# Patient Record
Sex: FEMALE | Race: WHITE | Employment: FULL TIME | URBAN - METROPOLITAN AREA
[De-identification: names, ages, dates, MRNs, and addresses within clinical notes are randomized per-mention and may not be internally consistent; named-entity substitution may affect disease eponyms.]

---

## 2018-04-23 ENCOUNTER — TRANSFERRED RECORDS (OUTPATIENT)
Dept: HEALTH INFORMATION MANAGEMENT | Facility: CLINIC | Age: 33
End: 2018-04-23

## 2018-04-23 ENCOUNTER — TRANSFERRED RECORDS (OUTPATIENT)
Dept: MULTI SPECIALTY CLINIC | Facility: CLINIC | Age: 33
End: 2018-04-23

## 2018-04-23 LAB
HPV ABSTRACT: NORMAL
HPV ABSTRACT: NORMAL
PAP-ABSTRACT: NORMAL

## 2020-01-21 ENCOUNTER — OFFICE VISIT (OUTPATIENT)
Dept: ORTHOPEDICS | Facility: CLINIC | Age: 35
End: 2020-01-21
Payer: COMMERCIAL

## 2020-01-21 VITALS
RESPIRATION RATE: 16 BRPM | WEIGHT: 153 LBS | HEIGHT: 66 IN | SYSTOLIC BLOOD PRESSURE: 146 MMHG | DIASTOLIC BLOOD PRESSURE: 89 MMHG | BODY MASS INDEX: 24.59 KG/M2

## 2020-01-21 DIAGNOSIS — M23.91 INTERNAL DERANGEMENT OF RIGHT KNEE: Primary | ICD-10-CM

## 2020-01-21 PROCEDURE — 99204 OFFICE O/P NEW MOD 45 MIN: CPT | Performed by: ORTHOPAEDIC SURGERY

## 2020-01-21 RX ORDER — IBUPROFEN 200 MG
200 TABLET ORAL EVERY 4 HOURS PRN
COMMUNITY
End: 2020-03-06

## 2020-01-21 RX ORDER — ETONOGESTREL AND ETHINYL ESTRADIOL VAGINAL RING .015; .12 MG/D; MG/D
1 RING VAGINAL
COMMUNITY

## 2020-01-21 ASSESSMENT — MIFFLIN-ST. JEOR: SCORE: 1410.75

## 2020-01-21 NOTE — PATIENT INSTRUCTIONS
- Please call [facility] at [phone] to schedule your MRI.     [facility] is located at:   [address] [place] [address]    -  Once your MRI is scheduled, make an appointment to discuss the results with Dr. Sandro Phipps.  You can call [phone] to make this appointment, anytime 2-3 days after your MRI scan is performed. Dr. Phipps prefers patients to come in for a follow-up appointment to discuss next steps after the MRI.

## 2020-01-21 NOTE — PROGRESS NOTES
Nathaniel Chowdhury is a 34 year old female who is seen as self referral for right knee pain.  She states that her pain began approximately 2018 with no specific mechanism of injury.  She states that her pain is episodic and described as shooting, sharp, aching, and can be rated anywhere from a 3 out of 10 to an 8 out of 10.  She works as a .  Her pain is worse with kneeling and bending; better with rest, not bending, not kneeling, and wearing a compression sleeve.  She has not done anything specific for the knee so far.  She does take Advil 1-2 times a day for various aches and pains and other things throughout the body not specifically for the knee. Here today for further evaluation.     History reviewed. No pertinent past medical history.    History reviewed. No pertinent surgical history.    Family History   Problem Relation Age of Onset     Emphysema Maternal Grandmother      Pneumonia Maternal Grandfather        Social History     Socioeconomic History     Marital status:      Spouse name: Not on file     Number of children: Not on file     Years of education: Not on file     Highest education level: Not on file   Occupational History     Not on file   Social Needs     Financial resource strain: Not on file     Food insecurity:     Worry: Not on file     Inability: Not on file     Transportation needs:     Medical: Not on file     Non-medical: Not on file   Tobacco Use     Smoking status: Former Smoker     Start date: 2003     Last attempt to quit: 2008     Years since quittin.0     Smokeless tobacco: Never Used   Substance and Sexual Activity     Alcohol use: Not on file     Drug use: Not on file     Sexual activity: Not on file   Lifestyle     Physical activity:     Days per week: Not on file     Minutes per session: Not on file     Stress: Not on file   Relationships     Social connections:     Talks on phone: Not on file     Gets together: Not on file     Attends  "Jewish service: Not on file     Active member of club or organization: Not on file     Attends meetings of clubs or organizations: Not on file     Relationship status: Not on file     Intimate partner violence:     Fear of current or ex partner: Not on file     Emotionally abused: Not on file     Physically abused: Not on file     Forced sexual activity: Not on file   Other Topics Concern     Not on file   Social History Narrative     Not on file       Current Outpatient Medications   Medication Sig Dispense Refill     etonogestrel-ethinyl estradiol (NUVARING) 0.12-0.015 MG/24HR vaginal ring Place 1 each vaginally every 28 days       ibuprofen (ADVIL/MOTRIN) 200 MG tablet Take 200 mg by mouth every 4 hours as needed for mild pain         No Known Allergies    REVIEW OF SYSTEMS:  CONSTITUTIONAL:  NEGATIVE for fever, chills, change in weight, not feeling tired  SKIN:  NEGATIVE for worrisome rashes, no skin lumps, no skin ulcers and no non-healing wounds  EYES:  NEGATIVE for vision changes or irritation.  ENT/MOUTH:  NEGATIVE.  No hearing loss, no hoarseness, no difficulty swallowing.  RESP:  NEGATIVE. No cough or shortness of breath.  CV:  NEGATIVE for chest pain, palpitations or peripheral edema  GI:  NEGATIVE for nausea, abdominal pain, heartburn, or change in bowel habits  :  Negative. No dysuria, no hematuria  MUSCULOSKELETAL:  See HPI above  NEURO:  NEGATIVE . No headaches, no dizziness,  no numbness  ENDOCRINE:  NEGATIVE for temperature intolerance, skin/hair changes  HEME/ALLERGY/IMMUNE:  NEGATIVE for bleeding problems  PSYCHIATRIC:  NEGATIVE. no anxiety, no depression.     Exam:  Vitals: BP (!) 146/89   Resp 16   Ht 1.676 m (5' 6\")   Wt 69.4 kg (153 lb)   BMI 24.69 kg/m    BMI= Body mass index is 24.69 kg/m .  Constitutional:  healthy, alert and no distress  Neuro: Alert and Oriented x 3, Sensation grossly WNL.  Psych: Affect normal   Respiratory: Breathing not labored.  Cardiovascular: normal " peripheral pulses  Lymph: no adenopathy  Skin: No rashes,worrisome lesions or skin problems  She has good range of motion of both knees from 0 to about 125-130 degrees.   She has some tenderness at the lateral aspect of the right knee especially posterior lateral.  She had no ligamentous laxity of MCL, LCL, cruciates.  She had negative medial Alan.  She did have some lateral pain with lateral Alan.  She has definite clunk reproducibly with lateral Alan.  Sensation, motor and circulation are intact.    Assessment; right knee pain with possible lateral meniscus tear.  This is gone on for a year and a half and exam is consistent with a possible lateral meniscus tear.  Plan: I recommend we proceed with right knee MRI scan to rule out meniscus tear.

## 2020-01-21 NOTE — LETTER
2020         RE: Nathaniel Chowdhury  [address] [facility] [address]        Dear Colleague,    Thank you for referring your patient, Nathaniel Chowdhury, to the [facility]. Please see a copy of my visit note below.    Nathaniel Chowdhury is a 34 year old female who is seen as self referral for right knee pain.  She states that her pain began approximately 2018 with no specific mechanism of injury.  She states that her pain is episodic and described as shooting, sharp, aching, and can be rated anywhere from a 3 out of 10 to an 8 out of 10.  She works as a .  Her pain is worse with kneeling and bending; better with rest, not bending, not kneeling, and wearing a compression sleeve.  She has not done anything specific for the knee so far.  She does take Advil 1-2 times a day for various aches and pains and other things throughout the body not specifically for the knee. Here today for further evaluation.     History reviewed. No pertinent past medical history.    History reviewed. No pertinent surgical history.    Family History   Problem Relation Age of Onset     Emphysema Maternal Grandmother      Pneumonia Maternal Grandfather        Social History     Socioeconomic History     Marital status:      Spouse name: Not on file     Number of children: Not on file     Years of education: Not on file     Highest education level: Not on file   Occupational History     Not on file   Social Needs     Financial resource strain: Not on file     Food insecurity:     Worry: Not on file     Inability: Not on file     Transportation needs:     Medical: Not on file     Non-medical: Not on file   Tobacco Use     Smoking status: Former Smoker     Start date: 2003     Last attempt to quit: 2008     Years since quittin.0     Smokeless tobacco: Never Used   Substance and Sexual Activity     Alcohol use: Not on file     Drug use: Not on file     Sexual activity: Not  "on file   Lifestyle     Physical activity:     Days per week: Not on file     Minutes per session: Not on file     Stress: Not on file   Relationships     Social connections:     Talks on phone: Not on file     Gets together: Not on file     Attends Bahai service: Not on file     Active member of club or organization: Not on file     Attends meetings of clubs or organizations: Not on file     Relationship status: Not on file     Intimate partner violence:     Fear of current or ex partner: Not on file     Emotionally abused: Not on file     Physically abused: Not on file     Forced sexual activity: Not on file   Other Topics Concern     Not on file   Social History Narrative     Not on file       Current Outpatient Medications   Medication Sig Dispense Refill     etonogestrel-ethinyl estradiol (NUVARING) 0.12-0.015 MG/24HR vaginal ring Place 1 each vaginally every 28 days       ibuprofen (ADVIL/MOTRIN) 200 MG tablet Take 200 mg by mouth every 4 hours as needed for mild pain         No Known Allergies    REVIEW OF SYSTEMS:  CONSTITUTIONAL:  NEGATIVE for fever, chills, change in weight, not feeling tired  SKIN:  NEGATIVE for worrisome rashes, no skin lumps, no skin ulcers and no non-healing wounds  EYES:  NEGATIVE for vision changes or irritation.  ENT/MOUTH:  NEGATIVE.  No hearing loss, no hoarseness, no difficulty swallowing.  RESP:  NEGATIVE. No cough or shortness of breath.  CV:  NEGATIVE for chest pain, palpitations or peripheral edema  GI:  NEGATIVE for nausea, abdominal pain, heartburn, or change in bowel habits  :  Negative. No dysuria, no hematuria  MUSCULOSKELETAL:  See HPI above  NEURO:  NEGATIVE . No headaches, no dizziness,  no numbness  ENDOCRINE:  NEGATIVE for temperature intolerance, skin/hair changes  HEME/ALLERGY/IMMUNE:  NEGATIVE for bleeding problems  PSYCHIATRIC:  NEGATIVE. no anxiety, no depression.     Exam:  Vitals: BP (!) 146/89   Resp 16   Ht 1.676 m (5' 6\")   Wt 69.4 kg (153 lb)   " BMI 24.69 kg/m     BMI= Body mass index is 24.69 kg/m .  Constitutional:  healthy, alert and no distress  Neuro: Alert and Oriented x 3, Sensation grossly WNL.  Psych: Affect normal   Respiratory: Breathing not labored.  Cardiovascular: normal peripheral pulses  Lymph: no adenopathy  Skin: No rashes,worrisome lesions or skin problems  She has good range of motion of both knees from 0 to about 125-130 degrees.   She has some tenderness at the lateral aspect of the right knee especially posterior lateral.  She had no ligamentous laxity of MCL, LCL, cruciates.  She had negative medial Alan.  She did have some lateral pain with lateral Alan.  She has definite clunk reproducibly with lateral Alan.  Sensation, motor and circulation are intact.    Assessment; right knee pain with possible lateral meniscus tear.  This is gone on for a year and a half and exam is consistent with a possible lateral meniscus tear.  Plan: I recommend we proceed with right knee MRI scan to rule out meniscus tear.      Again, thank you for allowing me to participate in the care of your patient.        Sincerely,        Sandro Phipps MD

## 2020-02-07 ENCOUNTER — ANCILLARY PROCEDURE (OUTPATIENT)
Dept: MRI IMAGING | Facility: CLINIC | Age: 35
End: 2020-02-07
Attending: PHYSICIAN ASSISTANT
Payer: COMMERCIAL

## 2020-02-07 DIAGNOSIS — M23.91 INTERNAL DERANGEMENT OF RIGHT KNEE: ICD-10-CM

## 2020-02-07 PROCEDURE — 73721 MRI JNT OF LWR EXTRE W/O DYE: CPT | Mod: TC

## 2020-02-25 ENCOUNTER — OFFICE VISIT (OUTPATIENT)
Dept: ORTHOPEDICS | Facility: CLINIC | Age: 35
End: 2020-02-25
Payer: COMMERCIAL

## 2020-02-25 VITALS
HEART RATE: 69 BPM | SYSTOLIC BLOOD PRESSURE: 152 MMHG | WEIGHT: 153 LBS | DIASTOLIC BLOOD PRESSURE: 101 MMHG | BODY MASS INDEX: 24.59 KG/M2 | HEIGHT: 66 IN | RESPIRATION RATE: 16 BRPM

## 2020-02-25 DIAGNOSIS — M84.351D: Primary | ICD-10-CM

## 2020-02-25 PROCEDURE — 99213 OFFICE O/P EST LOW 20 MIN: CPT | Performed by: ORTHOPAEDIC SURGERY

## 2020-02-25 ASSESSMENT — MIFFLIN-ST. JEOR: SCORE: 1410.75

## 2020-02-25 NOTE — PATIENT INSTRUCTIONS
Nathaniel to follow up with Primary Care provider regarding elevated blood pressure.    MRI normal except femoral marrow signal.  Unknown.  Consider x-ray and repeat MRI in 6 months.  Gentle strengthening.

## 2020-02-25 NOTE — PROGRESS NOTES
Nathaniel Barrios Sophia returns for her right knee MRI results.  MRI images were independently visualized with the patient.  These showed no medial or lateral meniscus tear.  No ligament damage.  No other knee problems.  There was a prominent focal linear marrow signal alteration at the posterior aspect of the femur.  It is not clear what this is related to.  There is no associated external periostitis no reactive bone formation or lesions noted.  It appeared possibly to represent a stress reaction.    Impression:  Right Knee: Possible stress reaction in the femur.  I am not finding anything structurally damaged with the knee.    Thus, our plan is Strengthening.  She should start gently.  If pain persists we should see back in 6 months with x-ray of the right knee and possibly repeat MRI scan.    Total time spent was 15 minutes; with 15 minutes spent face-to-face with patient discussing test results, treatment options, and estimated recovery time.

## 2020-02-25 NOTE — LETTER
2/25/2020         RE: Nathaniel Chowdhury  [address] [facility] [address]        Dear Colleague,    Thank you for referring your patient, Nathaniel Chowdhury, to the [facility]. Please see a copy of my visit note below.    Nathaniel Chowdhury returns for her right knee MRI results.  MRI images were independently visualized with the patient.  These showed no medial or lateral meniscus tear.  No ligament damage.  No other knee problems.  There was a prominent focal linear marrow signal alteration at the posterior aspect of the femur.  It is not clear what this is related to.  There is no associated external periostitis no reactive bone formation or lesions noted.  It appeared possibly to represent a stress reaction.    Impression:  Right Knee: Possible stress reaction in the femur.  I am not finding anything structurally damaged with the knee.    Thus, our plan is Strengthening.  She should start gently.  If pain persists we should see back in 6 months with x-ray of the right knee and possibly repeat MRI scan.    Total time spent was 15 minutes; with 15 minutes spent face-to-face with patient discussing test results, treatment options, and estimated recovery time.        Again, thank you for allowing me to participate in the care of your patient.        Sincerely,        Sandro Phipps MD

## 2020-03-06 ENCOUNTER — TELEPHONE (OUTPATIENT)
Dept: FAMILY MEDICINE | Facility: CLINIC | Age: 35
End: 2020-03-06

## 2020-03-06 ENCOUNTER — OFFICE VISIT (OUTPATIENT)
Dept: FAMILY MEDICINE | Facility: CLINIC | Age: 35
End: 2020-03-06
Payer: COMMERCIAL

## 2020-03-06 VITALS
TEMPERATURE: 98 F | HEART RATE: 78 BPM | OXYGEN SATURATION: 100 % | BODY MASS INDEX: 25.11 KG/M2 | HEIGHT: 67 IN | DIASTOLIC BLOOD PRESSURE: 84 MMHG | SYSTOLIC BLOOD PRESSURE: 137 MMHG | WEIGHT: 160 LBS

## 2020-03-06 DIAGNOSIS — N60.92 ATYPICAL LOBULAR HYPERPLASIA (ALH) OF LEFT BREAST: ICD-10-CM

## 2020-03-06 DIAGNOSIS — R93.6 ABNORMAL MAGNETIC RESONANCE IMAGING OF LOWER EXTREMITY: ICD-10-CM

## 2020-03-06 DIAGNOSIS — M25.561 ACUTE PAIN OF RIGHT KNEE: ICD-10-CM

## 2020-03-06 DIAGNOSIS — Z00.00 ROUTINE GENERAL MEDICAL EXAMINATION AT A HEALTH CARE FACILITY: Primary | ICD-10-CM

## 2020-03-06 PROCEDURE — 99213 OFFICE O/P EST LOW 20 MIN: CPT | Mod: 25 | Performed by: PHYSICIAN ASSISTANT

## 2020-03-06 PROCEDURE — 99385 PREV VISIT NEW AGE 18-39: CPT | Performed by: PHYSICIAN ASSISTANT

## 2020-03-06 ASSESSMENT — ENCOUNTER SYMPTOMS
WEAKNESS: 0
ARTHRALGIAS: 1
HEARTBURN: 0
SORE THROAT: 0
PALPITATIONS: 0
DIARRHEA: 0
PARESTHESIAS: 0
CHILLS: 0
BREAST MASS: 0
COUGH: 0
MYALGIAS: 1
JOINT SWELLING: 0
EYE PAIN: 0
DIZZINESS: 0
HEADACHES: 0
NERVOUS/ANXIOUS: 0
SHORTNESS OF BREATH: 0
HEMATURIA: 0
FEVER: 0
CONSTIPATION: 0
ABDOMINAL PAIN: 0
HEMATOCHEZIA: 0
NAUSEA: 0
DYSURIA: 0
FREQUENCY: 0

## 2020-03-06 ASSESSMENT — PAIN SCALES - GENERAL: PAINLEVEL: NO PAIN (0)

## 2020-03-06 ASSESSMENT — MIFFLIN-ST. JEOR: SCORE: 1450.45

## 2020-03-06 NOTE — PROGRESS NOTES
SUBJECTIVE:   CC: Nathaniel Chowdhury is an 34 year old woman who presents for preventive health visit.     Healthy Habits:     Getting at least 3 servings of Calcium per day:  Yes    Bi-annual eye exam:  Yes    Dental care twice a year:  Yes    Sleep apnea or symptoms of sleep apnea:  None    Diet:  Regular (no restrictions)    Frequency of exercise:  None    Taking medications regularly:  Yes    Barriers to taking medications:  None    Medication side effects:  None    PHQ-2 Total Score: 0    Additional concerns today:  Yes (right knee pain)      Atypical lobular hyperplasia- Left breast removed when she was 26. She has not continued to have any follow up about this. She has not had any regular mammograms and has no breast cancer screening plan in place.     She recently saw Dr. Phipps for ongoing right knee pain. MRI revealed a radiolucency in the femur- but they were unable to fully characterize. Dr. Phipps felt this was more of an acute stress reaction. Patient worried as pain has been present for 2 years and her history of cancer.   Mom with fibro, other autoimmune disease.       Today's PHQ-2 Score:   PHQ-2 (  [employer]) 3/6/2020   Q1: Little interest or pleasure in doing things 0   Q2: Feeling down, depressed or hopeless 0   PHQ-2 Score 0   Q1: Little interest or pleasure in doing things Not at all   Q2: Feeling down, depressed or hopeless Not at all   PHQ-2 Score 0       Abuse: Current or Past(Physical, Sexual or Emotional)- No  Do you feel safe in your environment? Yes        Social History     Tobacco Use     Smoking status: Former Smoker     Start date: 2003     Last attempt to quit: 2008     Years since quittin.1     Smokeless tobacco: Never Used   Substance Use Topics     Alcohol use: Yes     Comment: occ.     If you drink alcohol do you typically have >3 drinks per day or >7 drinks per week? No    Alcohol Use 3/6/2020   Prescreen: >3 drinks/day or >7 drinks/week? No   Prescreen:  ">3 drinks/day or >7 drinks/week? -     Reviewed orders with patient.  Reviewed health maintenance and updated orders accordingly - Yes  Lab work is in process    Mammogram not appropriate for this patient based on age. Referral in place to determine appropriate screenings based on her current age and history.      Pertinent mammograms are reviewed under the imaging tab.  History of abnormal Pap smear: NO - age 30-65 PAP every 5 years with negative HPV co-testing recommended     Reviewed and updated as needed this visit by clinical staff  Tobacco  Allergies  Meds  Problems  Med Hx  Surg Hx  Fam Hx  Soc Hx          Reviewed and updated as needed this visit by Provider  Tobacco  Allergies  Meds  Problems  Med Hx  Surg Hx  Fam Hx            Review of Systems   Constitutional: Negative for chills and fever.   HENT: Negative for congestion, ear pain, hearing loss and sore throat.    Eyes: Negative for pain and visual disturbance.   Respiratory: Negative for cough and shortness of breath.    Cardiovascular: Negative for chest pain, palpitations and peripheral edema.   Gastrointestinal: Negative for abdominal pain, constipation, diarrhea, heartburn, hematochezia and nausea.   Breasts:  Negative for breast mass and discharge.   Genitourinary: Negative for dysuria, frequency, genital sores, hematuria, pelvic pain, urgency, vaginal bleeding and vaginal discharge.   Musculoskeletal: Positive for arthralgias and myalgias. Negative for joint swelling.   Skin: Negative for rash.   Neurological: Negative for dizziness, weakness, headaches and paresthesias.   Psychiatric/Behavioral: Negative for mood changes. The patient is not nervous/anxious.           OBJECTIVE:   /84 (BP Location: Right arm, Patient Position: Chair, Cuff Size: Adult Regular)   Pulse 78   Temp 98  F (36.7  C) (Oral)   Ht 1.689 m (5' 6.5\")   Wt 72.6 kg (160 lb)   LMP 02/06/2020   SpO2 100%   Breastfeeding No   BMI 25.44 kg/m    Physical " Exam  GENERAL: healthy, alert and no distress  EYES: Eyes grossly normal to inspection, PERRL and conjunctivae and sclerae normal  HENT: ear canals and TM's normal, nose and mouth without ulcers or lesions  NECK: no adenopathy, no asymmetry, masses, or scars and thyroid normal to palpation  RESP: lungs clear to auscultation - no rales, rhonchi or wheezes  BREAST: normal without masses, tenderness or nipple discharge and no palpable axillary masses or adenopathy  CV: regular rate and rhythm, normal S1 S2, no S3 or S4, no murmur, click or rub, no peripheral edema and peripheral pulses strong  ABDOMEN: soft, nontender, no hepatosplenomegaly, no masses and bowel sounds normal  MS: no gross musculoskeletal defects noted, no edema  SKIN: no suspicious lesions or rashes  NEURO: Normal strength and tone, mentation intact and speech normal  PSYCH: mentation appears normal, affect normal/bright    Diagnostic Test Results:  none     ASSESSMENT/PLAN:       ICD-10-CM    1. Routine general medical examination at a health care facility Z00.00    2. Atypical lobular hyperplasia (ALH) of left breast N60.92 BREAST CENTER REFERRAL   3. Acute pain of right knee M25.561    4. Abnormal magnetic resonance imaging of lower extremity R93.6      We discussed at length > 15 mins in addition to her physical her recent MRI and the findings. We discussed that the entire femur and radiolucency was unable to be visualized on this MRI. We also discussed her history of breast cancer and no follow up plan established.   At this time patient is most concerned about possible metastasis into the leg.   We discussed that a visit with the breast center to discuss her future screenings and plan would be prudent. We did discuss the option of a femur MRI to fully characterize the lucency but patient opted to wait at this time.       COUNSELING:  Reviewed preventive health counseling, as reflected in patient instructions       Regular exercise       Healthy  "diet/nutrition       Contraception       Family planning    Estimated body mass index is 25.44 kg/m  as calculated from the following:    Height as of this encounter: 1.689 m (5' 6.5\").    Weight as of this encounter: 72.6 kg (160 lb).    Weight management plan: Discussed healthy diet and exercise guidelines     reports that she quit smoking about 12 years ago. She started smoking about 17 years ago. She has never used smokeless tobacco.      Counseling Resources:  ATP IV Guidelines  Pooled Cohorts Equation Calculator  Breast Cancer Risk Calculator  FRAX Risk Assessment  ICSI Preventive Guidelines  Dietary Guidelines for Americans, 2010  USDA's MyPlate  ASA Prophylaxis  Lung CA Screening    Lashonda Burton PA-C  [facility]    "

## 2020-03-06 NOTE — TELEPHONE ENCOUNTER
Panel Management Review      Patient has the following on her problem list: None      Composite cancer screening  Chart review shows that this patient is due/due soon for the following Pap Smear  Summary:    Patient is due/failing the following:   MAMMOGRAM    Action needed:   Patient in office today and discussed HM item due,  Patient is not due for pap screen was done at [facility] 4/23/18 an the result are NL. See care every where for results.    Type of outreach:    see message above.    Questions for provider review:    None                                                                                                                                    MARTINE Boyd MA       Chart routed to route to  .

## 2020-03-11 ENCOUNTER — TELEPHONE (OUTPATIENT)
Dept: FAMILY MEDICINE | Facility: CLINIC | Age: 35
End: 2020-03-11

## 2020-03-11 NOTE — TELEPHONE ENCOUNTER
Reason for Call:  Other     Detailed comments:  Patient saw Lashodna Burton this past Friday and she had given her a referral to [facility]. When patient called them to schedule they said they could not see the referral. They said because of the merger that now the referral has to be placed in the [facility] system.  Patient cannot answer her phone during the work day so please send her a [employer] message.    Phone Number Patient can be reached at: Home number on file [phone] (home)    Best Time: any    Can we leave a detailed message on this number? YES    Call taken on 3/11/2020 at 12:34 PM by Pretty Campa

## 2021-01-04 ENCOUNTER — HEALTH MAINTENANCE LETTER (OUTPATIENT)
Age: 36
End: 2021-01-04

## 2021-04-25 ENCOUNTER — HEALTH MAINTENANCE LETTER (OUTPATIENT)
Age: 36
End: 2021-04-25

## 2021-10-10 ENCOUNTER — HEALTH MAINTENANCE LETTER (OUTPATIENT)
Age: 36
End: 2021-10-10

## 2022-05-22 ENCOUNTER — HEALTH MAINTENANCE LETTER (OUTPATIENT)
Age: 37
End: 2022-05-22

## 2022-09-18 ENCOUNTER — HEALTH MAINTENANCE LETTER (OUTPATIENT)
Age: 37
End: 2022-09-18

## 2023-06-04 ENCOUNTER — HEALTH MAINTENANCE LETTER (OUTPATIENT)
Age: 38
End: 2023-06-04

## 2024-02-25 ENCOUNTER — HEALTH MAINTENANCE LETTER (OUTPATIENT)
Age: 39
End: 2024-02-25